# Patient Record
(demographics unavailable — no encounter records)

---

## 2017-12-02 NOTE — CP.PCM.HP
History of Present Illness





- History of Present Illness


History of Present Illness: 


CC-left elbow pain





4 year old female slipped from monkey bars and fell onto her outstretched left 

arm. She  had pain and decreased movement and parents brought her to ED.X rays 

revealed fracture of left distal humerus.Ortho will do closed reduction on 

Monday after decrease in swelling.She is admitted for pain management.





PMH-none


Immunization:uptodate


PSH-none


Medications-none


NKA


SH-she is in Pre-K.Lives with parents and siblings





Present on Admission





- Present on Admission


Any Indicators Present on Admission: No





Review of Systems





- Constitutional


Constitutional: absent: Fever





- EENT


Eyes: absent: Change in Vision


Ears: absent: Ear Pain


Nose/Mouth/Throat: absent: Nasal Congestion





- Cardiovascular


Cardiovascular: absent: Chest Pain





- Respiratory


Respiratory: absent: Cough, Dyspnea





- Gastrointestinal


Gastrointestinal: absent: Abdominal Pain, Diarrhea, Vomiting





- Genitourinary


Genitourinary: absent: Dysuria





- Musculoskeletal


Additional comments: 





left elbow pain





- Integumentary


Integumentary: absent: Rash





- Neurological


Neurological: absent: Abnormal Movements





- Psychiatric


Psychiatric: absent: Abnormal Sleep Pattern





- Endocrine


Endocrine: absent: Fatigue





Past Patient History





- CARDIAC


Hx Cardiac Disorders: No





- PULMONARY


Hx Respiratory Disorders: No





- NEUROLOGICAL


Hx Neurological Disorder: No





- HEENT


Hx HEENT Problems: No





- RENAL


Hx Chronic Kidney Disease: No





- ENDOCRINE/METABOLIC


Hx Endocrine Disorders: No





- HEMATOLOGICAL/ONCOLOGICAL


Hx Blood Disorders: No


Hx Blood Transfusions: No





- INTEGUMENTARY


Hx Dermatological Problems: No





- MUSCULOSKELETAL/RHEUMATOLOGICAL


Hx Musculoskeletal Disorders: No





- GASTROINTESTINAL


Hx Gastrointestinal Disorders: No





- GENITOURINARY/GYNECOLOGICAL


Hx Genitourinary Disorders: No





- PSYCHIATRIC


Hx Psychophysiologic Disorder: No





- SURGICAL HISTORY


Hx Surgeries: No





- ANESTHESIA


Hx Anesthesia: No





Meds


Allergies/Adverse Reactions: 


 Allergies











Allergy/AdvReac Type Severity Reaction Status Date / Time


 


No Known Allergies Allergy   Verified 12/02/17 15:17














Physical Exam





- Constitutional


Appears: Well, No Acute Distress





- Head Exam


Head Exam: NORMAL INSPECTION, NORMOCEPHALIC





- Eye Exam


Eye Exam: EOMI, Normal appearance, PERRL





- ENT Exam


ENT Exam: Mucous Membranes Moist, Normal Exam, Normal Oropharynx, TM's Normal 

Bilaterally





- Neck Exam


Neck exam: Positive for: Full Rom, Normal Inspection.  Negative for: 

Lymphadenopathy





- Respiratory Exam


Respiratory Exam: Clear to Auscultation Bilateral, NORMAL BREATHING PATTERN





- Cardiovascular Exam


Cardiovascular Exam: REGULAR RHYTHM, +S1, +S2


Additional comments: 





No murmur





- GI/Abdominal Exam


GI & Abdominal Exam: Normal Bowel Sounds, Soft.  absent: Mass





- Extremities Exam


Extremities exam: Positive for: normal capillary refill


Additional comments: 





Left arm in soft cast.Patient able to wiggle fingers.No swelling of 

fingers.Warm to touch





- Neurological Exam


Neurological exam: Alert, Oriented x3





- Skin


Skin Exam: Normal Color, Warm





Results





- Vital Signs


Recent Vital Signs: 





 Last Vital Signs











Temp  98.5 F   12/02/17 18:52


 


Pulse  108   12/02/17 18:52


 


Resp  18 L  12/02/17 18:52


 


BP  110/75   12/02/17 18:52


 


Pulse Ox  99   12/02/17 18:52














- Labs


Result Diagrams: 


 12/02/17 17:45





 12/02/17 17:45


Labs: 





 Laboratory Results - last 24 hr











  12/02/17 12/02/17





  17:45 17:45


 


WBC  15.7 H 


 


RBC  5.55 H 


 


Hgb  10.2 L 


 


Hct  32.9 


 


MCV  59.3 L 


 


MCH  18.5 L 


 


MCHC  31.2 L 


 


RDW  15.9 H 


 


Plt Count  298 


 


MPV  8.8 


 


Neut % (Auto)  76.8 H 


 


Lymph % (Auto)  15.8 L 


 


Mono % (Auto)  6.2 


 


Eos % (Auto)  0.9 


 


Baso % (Auto)  0.3 


 


Neut #  12.0 H 


 


Lymph #  2.5 


 


Mono #  1.0 H 


 


Eos #  0.1 


 


Baso #  0.1 


 


Sodium   141


 


Potassium   3.8


 


Chloride   108 H


 


Carbon Dioxide   26


 


Anion Gap   11


 


BUN   17


 


Creatinine   0.4


 


Est GFR ( Amer)   TNP


 


Est GFR (Non-Af Amer)   TNP


 


Random Glucose   151 H


 


Calcium   9.3


 


Total Bilirubin   0.2


 


AST   43


 


ALT   39


 


Alkaline Phosphatase   243


 


Total Protein   7.3


 


Albumin   4.0


 


Globulin   3.3


 


Albumin/Globulin Ratio   1.2














Assessment & Plan





- Assessment and Plan (Free Text)


Assessment: 





4 year old female with left distal humerus fracture


Plan: 





Admit to pediatric floor


Orthopedic consult called from ER


Ibuprofen PRN pain


Regular diet


Saline lock





- Date & Time


Date: 12/02/17


Time: 19:03

## 2017-12-02 NOTE — ED PDOC
HPI: Pediatric Injury





- HPI


Time Seen by Provider: 17 15:18


Chief Complaint (Nursing): Upper Extremity Problem/Injury


Chief Complaint (Provider): Left Elbow Pain


History Per: Patient, Family (parents)


History/Exam Limitations: no limitations


Onset/Duration Of Symptoms: Mins


Injury Occurred (Timing): Today @ (1300)


Injury Occurred At: Park/Playground


Additional Complaint(s): 


Rona Lyn, a 4 year old female, is brought into the ED by her parents 

complaining of left elbow pain. As per mother, at around 1300 the patient was 

at the park playing on the monkey bars. She states that the patient attempted 

to grab one of the bars but missed causing her to fall onto her outstretched 

left arm. Parent reports that the patient has been having pin and decreased 

movement since then. The mother states that the patient was given ibuprofen 

around 1400 and the father also made a make shift sling and then brought her in 

to the ED to be evaluated. Denies head injury or any other injury. Vaccinations 

up to date.





Pediatrician: Dr. Jewell





- Birth History


Length of Pregnancy: Full Term





Past Medical History-Pediatric


Reviewed: Historical Data, Nursing Documentation, Vital Signs





- Medical History


PMH: No Chronic Diseases





- Surgical History


Surgical History: No Surg Hx





- Family History


Family History: States: Unknown Family Hx





- Social History


Lives With A Smoker: No





- Immunization History


Hx Tetanus Toxoid Vaccination: Yes


Hx Influenza Vaccination: Yes


Hx Pneumococcal Vaccination: Yes





- Home Medications


Home Medications: 


 Ambulatory Orders











 Medication  Instructions  Recorded


 


Acetaminophen with Codeine 5 ml PO Q4 PRN #120 ml 17





[Acetaminop-Codeine 120-12 mg/5]  


 


Cephalexin Susp [Keflex] 5 ml PO BID #10 ml 17


 


Ibuprofen Susp [Motrin Oral Susp] 190 mg PO Q6 PRN  udc 17














- Allergies


Allergies/Adverse Reactions: 


 Allergies











Allergy/AdvReac Type Severity Reaction Status Date / Time


 


No Known Allergies Allergy   Verified 17 15:17














Review of Systems


ROS Statement: Except As Marked, All Systems Reviewed And Found Negative


Constitutional: Negative for: Fever, Chills


Musculoskeletal: Positive for: Arm Pain (left arm pain)


Skin: Negative for: Lesions


Neurological: Negative for: Weakness, Numbness





Physical Exam - Pediatric





- Physical Exam


Appears: In Acute Distress (mild painful)


Head Exam: ATRAUMATIC, NORMOCEPHALIC


Skin: Warm, Dry


Eye Exam: bilateral eye: PERRL, EOMI


Neck: Painless ROM, Supple


Extremity: Other (LEFT upper extremity: held in adduction with flexion at the 

elbow, edema slight deformity at area of distal humerus with ttp at this site, 

unable to range at elbow due to pain; wrist flex/ext, thumb abduction and 

opposition and finger abduction 5/5 strenth; light touch intact in all nerve 

distrbutions of LEFT hand; <2 sec cap refill; 2+ radial pulse)


Neurological/Psych: Normal Motor, Normal Sensation


Gait: Steady





- Laboratory Results


Result Diagrams: 


 17 17:45





 17 17:45





- ECG


O2 Sat by Pulse Oximetry: 99 (RA)


Pulse Ox Interpretation: Normal





- Other Rad


  ** LEFT elbow


X-Ray: Interpreted by Me (Lateral condylar fracture with slight displacement)





Medical Decision Making


Medical Decision Makin


Initial Impression


5 y/o female presenting with left elbow injury





Differentials: Fracture, Strain, Dislocation





Initial Plan:


* Rad LFT Elbow 3 views 


* Motrin 190mg PO 


* Tylenol 240mg PO 


* Apply Ice to affected area 


* Reevaluation


 430p


Xray demonstrated elbow fracture. Discussed with Dr Lori sanders, who 

reviewed xray then discussed management with mother on the phone. Pt to be 

admitted for elbow edema and surgical management of fracture. Placed in 

immobilizer by EDT and neurovascularly intact after splint.





_________________________________________________________________


Scribe Attestation


Documented by Jie Floyd acting as a scribe for Shanita Davenport MD.





Provider Attestation


All medical record entries made by the Scribe were at my direction and 

personally dictated by me. I have reviewed the chart and agree that the record 

accurately reflects my personal performance of the history, physical exam, 

medical decision making, and the department course for this patient. I have 

also personally directed, reviewed, and agree with the discharge instructions 

and disposition.





PECARN





- Discussion


Discussion: 








Disposition





- Clinical Impression


Clinical Impression: 


 Traumatic closed displaced fracture of lateral condyle of left elbow





Counseled Patient/Family Regarding: Studies Performed, Diagnosis





- Disposition


Disposition Time: 16:30


Condition: STABLE





- Pt Status Changed To:


Hospital Disposition Of: Inpatient





- Admit Certification


Admit to Inpatient:: After my assessment, the patient will require 

hospitalization for at least two midnights.  This is because of the severity of 

symptoms shown, intensity of services needed, and/or the medical risk in this 

patient being treated as an outpatient.





- POA


Present On Arrival: Falls Or Trauma

## 2017-12-02 NOTE — CT
EXAM:

  CT Left Upper Extremity Without Intravenous  Contrast, Elbow



CLINICAL HISTORY:

  4 years old, female; Injury or trauma; Fall; Initial encounter; Fracture, 

traumatic injury; Closed fracture and displaced; Elbow; Left; Additional info: 

Left elbow fracture



TECHNIQUE:

  Axial computed tomography images of the left elbow without intravenous 

contrast.  All CT scans at this facility use one or more dose reduction 

techniques, viz.: automated exposure control; ma/kV adjustment per patient size 

(including targeted exams where dose is matched to indication; i.e. head); or 

iterative reconstruction technique.

  Coronal and sagittal reformatted images were created and reviewed.



COMPARISON:

  No relevant prior studies available.



FINDINGS:

  Limitations:  Suboptimal positioning.

  Bones/joints:  Comminuted, minimally displaced fracture distal humeral 

metaphysis.  Minimal anterior angulation at fracture apex.  No dislocation.  

Joint effusion.

  Soft tissues:  Soft tissue stranding/swelling.



IMPRESSION:     

1.  Distal humeral fracture.

## 2017-12-03 NOTE — RAD
PROCEDURE:  Left elbow dated 12/02/2017



HISTORY:

Left elbow injury. 



COMPARISON:

Correlation made with concurrent radiograph of the right elbow.



TECHNIQUE:

Three views of the right three views of the left elbow performed.  In 

addition, three views of the right elbow obtained for comparison 

purposes. 



FINDINGS:

The current study reveals an apparent comminuted fracture of the 

distal left humerus (intracondylar



IMPRESSION:

Apparent intracondylar comminuted fracture of the distal left 

humerus. Follow-up CT scan recommended.

## 2017-12-03 NOTE — CP.PCM.PN
Subjective





- Date & Time of Evaluation


Date of Evaluation: 12/03/17


Time of Evaluation: 12:00





- Subjective


Subjective: 





4-year-old girl admitted to PEDS yesterday B/O left supracondylar humerus FX 

that happened after a fall yesterday afternoon.





On exam today:


Left arm in splint.


No pain.


Normal temp and movement of left fingers.


No other complaints.





Child is usually healthy.  No previous surgeries.


Seen by ortho and scheduled for reduction tomorrow morning (plan to do ORIF).





Objective





- Vital Signs/Intake and Output


Vital Signs (last 24 hours): 


 











Temp Pulse Resp BP Pulse Ox


 


 98.5 F   88   20   116/82 H  100 


 


 12/03/17 13:00  12/03/17 13:00  12/03/17 13:00  12/03/17 08:12  12/03/17 13:00











- Medications


Medications: 


 Current Medications





Ibuprofen (Motrin Oral Susp)  190 mg PO Q6 PRN


   PRN Reason: mild-moderate pain


   Last Admin: 12/02/17 23:34 Dose:  190 mg











- Labs


Labs: 


 





 12/02/17 17:45 





 12/02/17 17:45 











- Constitutional


Appears: Well





- Head Exam


Head Exam: ATRAUMATIC, NORMAL INSPECTION





- Eye Exam


Eye Exam: EOMI, Normal appearance, PERRL.  absent: Conjunctival injection, 

Periorbital swelling


Pupil Exam: absent: Miosis, Mydriatic





- ENT Exam


ENT Exam: Normal Exam





- Neck Exam


Neck Exam: Full ROM.  absent: Lymphadenopathy





- Respiratory Exam


Respiratory Exam: Clear to Ausculation Bilateral, NORMAL BREATHING PATTERN.  

absent: Decreased Breath Sounds, Prolonged Expiratory Phase, Rales, Rhonchi, 

Wheezes





- Cardiovascular Exam


Cardiovascular Exam: REGULAR RHYTHM.  absent: Bradycardia, Tachycardia, Murmur





- GI/Abdominal Exam


GI & Abdominal Exam: Soft.  absent: Distended, Tenderness





- Extremities Exam


Additional comments: 





Left arm in cast.  Mild swelling of the left hand.  Normal temp, sensation, 

color and movement of the left fingers.





- Neurological Exam


Neurological Exam: Alert, Awake, CN II-XII Intact





- Skin


Skin Exam: Normal Color, Warm


Additional comments: 





No acute rash.





Assessment and Plan





- Assessment and Plan (Free Text)


Assessment: 


4-year-old girl with left supracondylar humerus FX.


Plan: 


Plan for reduction tomorrow.


NPO after midnight.


IVF overnight.

## 2017-12-03 NOTE — CP.PCM.CON
History of Present Illness





- History of Present Illness


History of Present Illness: 





ID; 3 yo female





CC: pain and deformity L elbow  Pt presents at this encounter with mother and 

ped nurse Lynne





HPI:  3 yo female presents after fall from monkey bars in park yesterday 

afternoon.  Presents to [facility] with displaced/angulated supracondylar humerus fx





 Xrays reviewed CT confirmatory.  pt admitted with swollen L elbow;when 

swelling decreases, pt to OR (Monday AM)





Review of Systems





- Hematologic/Lymphatic


Additional comments: 





ROS-not contrib  no neuro deficit





Past Patient History





- CARDIAC


Hx Cardiac Disorders: No





- PULMONARY


Hx Respiratory Disorders: No





- NEUROLOGICAL


Hx Neurological Disorder: No





- HEENT


Hx HEENT Problems: No





- RENAL


Hx Chronic Kidney Disease: No





- ENDOCRINE/METABOLIC


Hx Endocrine Disorders: No





- HEMATOLOGICAL/ONCOLOGICAL


Hx Blood Disorders: No


Hx Blood Transfusions: No





- INTEGUMENTARY


Hx Dermatological Problems: No





- MUSCULOSKELETAL/RHEUMATOLOGICAL


Hx Musculoskeletal Disorders: No





- GASTROINTESTINAL


Hx Gastrointestinal Disorders: No





- GENITOURINARY/GYNECOLOGICAL


Hx Genitourinary Disorders: No





- PSYCHIATRIC


Hx Psychophysiologic Disorder: No





- SURGICAL HISTORY


Hx Surgeries: No





- ANESTHESIA


Hx Anesthesia: No





Meds


Allergies/Adverse Reactions: 


 Allergies











Allergy/AdvReac Type Severity Reaction Status Date / Time


 


No Known Allergies Allergy   Verified 12/02/17 15:17














- Medications


Medications: 


 Current Medications





Ibuprofen (Motrin Oral Susp)  190 mg PO Q6 PRN


   PRN Reason: mild-moderate pain


   Last Admin: 12/02/17 23:34 Dose:  190 mg











Physical Exam





- Skin


Additional comments: 


Physical Exam





systemic wnl





Musculoskeletal


stance.gait- defrred


L upper ext splint intact


elbow swollen


N/V intact





Results





- Vital Signs


Recent Vital Signs: 


 Last Vital Signs











Temp  98.6 F   12/03/17 08:12


 


Pulse  87   12/03/17 08:12


 


Resp  18 L  12/03/17 08:12


 


BP  116/82 H  12/03/17 08:12


 


Pulse Ox  99   12/03/17 08:12














- Labs


Result Diagrams: 


 12/02/17 17:45





 12/02/17 17:45


Labs: 


 Laboratory Results - last 24 hr











  12/02/17 12/02/17 12/02/17





  17:45 17:45 17:45


 


WBC  15.7 H  


 


RBC  5.55 H  


 


Hgb  10.2 L  


 


Hct  32.9  


 


MCV  59.3 L  


 


MCH  18.5 L  


 


MCHC  31.2 L  


 


RDW  15.9 H  


 


Plt Count  298  


 


MPV  8.8  


 


Neut % (Auto)  76.8 H  


 


Lymph % (Auto)  15.8 L  


 


Mono % (Auto)  6.2  


 


Eos % (Auto)  0.9  


 


Baso % (Auto)  0.3  


 


Neut #  12.0 H  


 


Lymph #  2.5  


 


Mono #  1.0 H  


 


Eos #  0.1  


 


Baso #  0.1  


 


Sodium   141 


 


Potassium   3.8 


 


Chloride   108 H 


 


Carbon Dioxide   26 


 


Anion Gap   11 


 


BUN   17 


 


Creatinine   0.4 


 


Est GFR ( Amer)   TNP 


 


Est GFR (Non-Af Amer)   TNP 


 


Random Glucose   151 H 


 


Calcium   9.3 


 


Total Bilirubin   0.2 


 


AST   43 


 


ALT   39 


 


Alkaline Phosphatase   243 


 


Total Protein   7.3 


 


Albumin   4.0 


 


Globulin   3.3 


 


Albumin/Globulin Ratio   1.2 


 


Blood Type    O POSITIVE


 


Blood Type Confirm   


 


Antibody Screen    Negative


 


BBK History Checked    No verified bt














  12/02/17





  19:37


 


WBC 


 


RBC 


 


Hgb 


 


Hct 


 


MCV 


 


MCH 


 


MCHC 


 


RDW 


 


Plt Count 


 


MPV 


 


Neut % (Auto) 


 


Lymph % (Auto) 


 


Mono % (Auto) 


 


Eos % (Auto) 


 


Baso % (Auto) 


 


Neut # 


 


Lymph # 


 


Mono # 


 


Eos # 


 


Baso # 


 


Sodium 


 


Potassium 


 


Chloride 


 


Carbon Dioxide 


 


Anion Gap 


 


BUN 


 


Creatinine 


 


Est GFR ( Amer) 


 


Est GFR (Non-Af Amer) 


 


Random Glucose 


 


Calcium 


 


Total Bilirubin 


 


AST 


 


ALT 


 


Alkaline Phosphatase 


 


Total Protein 


 


Albumin 


 


Globulin 


 


Albumin/Globulin Ratio 


 


Blood Type 


 


Blood Type Confirm  O POSITIVE


 


Antibody Screen 


 


BBK History Checked 














- Impressions


Impression: 





Xtray- disoplaced/angulated L supracondylary humerus fracture








CT- displaced/angulated supracondylar humerus fx





Assessment & Plan





- Assessment and Plan (Free Text)


Assessment: 





A- displaced/angulated supracondyl;ar humerus fx








P- to OR for ORIF displaced/angulated supracondylar humerus fx  Pros/cons risk 

and benfitis discussed at length- possibility of mechanical failure/infection/

thromboembolic disease/possibility of secondary nasir for pin removal discussed   

Possibilit of growth disturbance/limited ROM 2nd or tertiary surgery discussed 

  Option of second opinion or transfer discussed   possibility of growth 

disturbance/nerve injury/mechanical failure/infection/thromboembolic disease/ 

secondary or tertiary surg discussed  NO PROMISES GUARANTEES!!!

## 2017-12-04 NOTE — PCM.SURG1
Surgeon's Initial Post Op Note





- Surgeon's Notes


Surgeon: Lori


Assistant: CIERA Mack


Type of Anesthesia: General Endo


Anesthesia Administered By: Ulysses Ji


Pre-Operative Diagnosis: Displaced/angulated/comminuted supracondylar humerus 

fx L elbow


Operative Findings: displaced/comminuted supracondylat L humerus fx


Post-Operative Diagnosis: as above


Operation Performed: ORIF DISPLACED/ANGULATED/COMMINUTED SUPRACONDYLAR HUMERUS 

FX.  ELBOW ARTHROTOMY.  APPLX LONG ARM CAST.  POSITIONING OF FLUOR/

INTERPREEATION OF VIDEO IMAGES`


Specimen/Specimens Removed: fx callous


Estimated Blood Loss: EBL {In ML}: 10


Blood Products Given: N/A


Drains Used: No Drains


Post-Op Condition: Good


Date of Surgery/Procedure: 12/04/17


Time of Surgery/Procedure: 12:50 (time in room/anaesthwesia induction time 1150)

## 2017-12-04 NOTE — CP.PCM.DIS
Provider





- Provider


Date of Admission: 


12/02/17 16:35





Attending physician: 


Oneyda Cerraot MD





Primary care physician: 





Fanta Medeiros


Consults: 


ortho. ( Dr. Darby)


Time Spent in preparation of Discharge (in minutes): 28





Diagnosis





- Discharge Diagnosis


(1) Traumatic closed displaced fracture of lateral condyle of left elbow


Status: Acute   Priority: High   





Hospital Course





- Lab Results


Lab Results: 


 Most Recent Lab Values











WBC  15.7 K/uL (4.5-15.5)  H  12/02/17  17:45    


 


RBC  5.55 Mil/uL (3.70-5.10)  H  12/02/17  17:45    


 


Hgb  10.2 g/dL (11.0-16.0)  L  12/02/17  17:45    


 


Hct  32.9 % (32.0-45.0)   12/02/17  17:45    


 


MCV  59.3 fl (70.0-95.0)  L  12/02/17  17:45    


 


MCH  18.5 pg (25.0-32.0)  L  12/02/17  17:45    


 


MCHC  31.2 g/dL (32.0-38.0)  L  12/02/17  17:45    


 


RDW  15.9 % (11.5-14.5)  H  12/02/17  17:45    


 


Plt Count  298 K/uL (130-400)   12/02/17  17:45    


 


MPV  8.8 fl (7.2-11.7)   12/02/17  17:45    


 


Neut % (Auto)  76.8 % (25.0-65.0)  H  12/02/17  17:45    


 


Lymph % (Auto)  15.8 % (40.0-70.0)  L  12/02/17  17:45    


 


Mono % (Auto)  6.2 % (0.0-10.0)   12/02/17  17:45    


 


Eos % (Auto)  0.9 % (0.0-4.0)   12/02/17  17:45    


 


Baso % (Auto)  0.3 % (0.0-2.0)   12/02/17  17:45    


 


Neut #  12.0 K/uL (1.5-8.5)  H  12/02/17  17:45    


 


Lymph #  2.5 K/uL (1.6-7.4)   12/02/17  17:45    


 


Mono #  1.0 K/uL (0.0-0.8)  H  12/02/17  17:45    


 


Eos #  0.1 K/uL (0.0-0.7)   12/02/17  17:45    


 


Baso #  0.1 K/uL (0.0-0.2)   12/02/17  17:45    


 


Sodium  141 mmol/l (132-148)   12/02/17  17:45    


 


Potassium  3.8 MMOL/L (3.6-5.0)   12/02/17  17:45    


 


Chloride  108 mmol/L ()  H  12/02/17  17:45    


 


Carbon Dioxide  26 mmol/L (22-30)   12/02/17  17:45    


 


Anion Gap  11  (10-20)   12/02/17  17:45    


 


BUN  17 mg/dl (7-17)   12/02/17  17:45    


 


Creatinine  0.4 mg/dl (0.2-0.5)   12/02/17  17:45    


 


Est GFR ( Amer)  TNP   12/02/17  17:45    


 


Est GFR (Non-Af Amer)  TNP   12/02/17  17:45    


 


Random Glucose  151 mg/dL ()  H  12/02/17  17:45    


 


Calcium  9.3 mg/dL (8.4-10.2)   12/02/17  17:45    


 


Total Bilirubin  0.2 mg/dl (0.2-1.3)   12/02/17  17:45    


 


AST  43 U/L (8-50)   12/02/17  17:45    


 


ALT  39 U/L (9-52)   12/02/17  17:45    


 


Alkaline Phosphatase  243 U/L (169-372)   12/02/17  17:45    


 


Total Protein  7.3 G/DL (6.3-8.2)   12/02/17  17:45    


 


Albumin  4.0 g/dL (3.5-5.0)   12/02/17  17:45    


 


Globulin  3.3 gm/dL (2.2-3.9)   12/02/17  17:45    


 


Albumin/Globulin Ratio  1.2  (1.0-2.1)   12/02/17  17:45    


 


Blood Type  O POSITIVE   12/02/17  17:45    


 


Blood Type Confirm  O POSITIVE   12/02/17  19:37    


 


Antibody Screen  Negative   12/02/17  17:45    


 


BBK History Checked  No verified bt   12/02/17  17:45    














- Hospital Course


Hospital Course: 





The patient was admitted for c/o trauma to left arm, s/p fall off monkey bar.


She had fracture of left distal humerus that was initially splinted.


She underwent ORIF today and had 2 doses of Ancef.


She' s not in pain and wants to go home.


She tolerated PO challenge.


DX: S/P ORIF of left elbow fracture.








Discharge Exam





- Head Exam


Head Exam: ATRAUMATIC, NORMOCEPHALIC





- Eye Exam


Eye Exam: Normal appearance





- Neck Exam


Neck exam: Normal Inspection





- Extremities Exam


Extremities exam: full ROM, normal capillary refill, normal inspection (except 

left arm is in a cast, normal color and no swelling of fingers.)





Discharge Plan





- Discharge Medications


Prescriptions: 


Acetaminophen with Codeine [Acetaminop-Codeine 120-12 mg/5] 5 ml PO Q4 PRN #120 

ml


 PRN Reason: Pain, Moderate (4-7)


Cephalexin Susp [Keflex] 5 ml PO BID #10 ml





- Follow Up Plan


Condition: STABLE


Disposition: HOME/ ROUTINE


Patient education suggested?: Yes


Instructions:  Elbow Fracture in Children (GEN), Patient Safety in the [facility] 

for Children (GEN), How To Wash Your Hands (GEN)

## 2017-12-04 NOTE — RAD
PROCEDURE:  Left humerus 



HISTORY:

post op



COMPARISON:

12/02/2017.  Preoperative examination documenting comminuted fracture 

of distal left humerus.



TECHNIQUE:

Standard protocol for this study/examination. Portable study 16:25 



FINDINGS:

Satisfactory postoperative status.  Orthopedic hardware in 

satisfactory position and alignment traversing fracture fragments of 

the distal humerus.



IMPRESSION:

Satisfactory postoperative status. Limitations of the current study: 

Detail obscured by overlying fiberglass cast.

## 2017-12-05 NOTE — OP
PROCEDURE DATE:  12/04/2017



LOCATION:  [facility].



PREOPERATIVE DIAGNOSIS:  Displaced angulated comminuted supracondylar

fracture of the left humerus.



POSTOPERATIVE DIAGNOSIS:  Displaced angulated comminuted supracondylar

fracture of the left humerus.



PROCEDURES PERFORMED:

1.  Open reduction and internal fixation with two incisions of displaced

comminuted angulated supracondylar left humerus fracture and left elbow.

2.  Arthrotomy, left elbow.

3.  Application of long-arm cast.

4.  Positioning of fluoroscope, interpretation of video images.



SURGEON:  Kishore Sandoval MD



FIRST ASSISTANT:  Muriel Jose, certified registered nursing first

assistant.



TYPE OF ANESTHESIA:  General endotracheal anesthesia.



ANESTHESIA ADMINISTERED BY:  Raphael Ji MD



COMPLICATIONS:  None.



DRAINS:  None.



OPERATIVE INDICATION:  Rona Lyn is a 4-year-old young lady who

sustained an injury on the monkey bars late Saturday afternoon.  The

patient was admitted to [facility] with marked

tenderness and swelling of the left elbow.  Pros, cons, risks, and benefits

of surgical approach were discussed.  The patient could not be taken to

surgery on Sunday because of the swelling.  Neurocirculatory status is

intact.  There is no evidence of compartment syndrome.  X-rays and CT scan

were reviewed.  Pros, cons, risks, and benefits of surgical approach were

discussed at length with the patient's mother, Marlen Valadez.  The

possibility of mechanical failure of the hardware, displacement of the

hardware, infection, thromboembolic disease, residual deformity, secondary

or tertiary surgery was discussed.  The concept that I am not a dedicated

pediatric orthopedist was discussed.  The concept that the patient will

definitely require a secondary procedure for hardware removal was

discussed.  The concept that perhaps later corrective osteotomy may be

necessary was discussed at length as well.



OPERATIVE PROCEDURE:  After having obtained informed consent, after having

identified side, site, and procedure and a critical pause/time-out, after

the satisfactory induction of the anesthetic, the patient identified as

Rona Lyn in the supine position with all bony prominences well padded. 

The left upper extremity was prepped and draped in the usual fashion for

upper extremity surgery.  Closed reduction was attempted, but failed. 

Verification of position was offered on AP and lateral image

intensification views.  Two attempts were made.  At this point in time, it

was found that a successful open reduction would be necessary.  At this

point, an incision is made centered on the lateral epicondyle and extending

superficially over the extensor musculature _____ and proximally bisecting

the humerus.  Great care was taken to be cognizant of the radial nerve and

the superficial sensory branch of the radial nerve.  Skin incision was

carried down through the skin and subcutaneous tissue.  The fascia was

divided.  At this point in time, great care was taken to avoid injury to

the nerve by keeping the forearm pronated.  This having been accomplished,

the dissection was carried down to the lateral aspect of the humerus again

using #15 blade.  The lateral fragment is comminuted, found to be angulated

and displaced with rotation of the distal fragment.  Two attempts were made

for closed reduction with exacerbation of the deformity, reversal of

deformity and locking the fragment in the pronation of the forearm with the

fixation from the radial side.  It was unable to control rotation from just

the radial side.  At this point in time, decision was made under the

surgeon's direction to fluoroscope this position.  Video images were

generated, therapeutic decisions were made therefrom.  Decision was made at

that point in time to open the medial aspect, taking great care to be

cognizant of the ulnar nerve.  Incision was described two fingerbreadths

proximal to the medial epicondyle and two fingerbreadths distally.  The

skin incision was carried down through the skin and subcutaneous tissue. 

At this point in time, hemostasis was controlled.  The dissection was

carried out medially to the proximal aspect of the humerus and to the area

of the medial supracondylar fragment.  At this point in time, all pins had

been removed on the lateral side and the fracture was again reduced with

flexion and held in that way.  Great care was taken to carefully dissect

and protect the ulnar nerve posteriorly, and we flexed and marked an

acceptable position of the supracondylar fragment and one pin was

introduced medially, taking great care to avoid injury to the ulnar nerve. 

This having been accomplished, one pin was placed across the fragment

ulnarly from the medial aspect of the epicondyle into the humerus. 

Attention was now turned to the radial side and reduction was found to be

more easily accomplished and three pins were placed across the fracture. 

It should be noted that arthrotomy had been accomplished to evaluate the

reduction.  Reduction was found to be acceptable with the joint

reconstituted.  The wound was thoroughly irrigated at this point in time. 

Closure was in layers, taking great care to avoid injury again to the

radial nerve, most particularly the superficial sensory branch.  Closure

was in layers with Vicryl and staples.  Closure medially was also with

Vicryl and staples.  Careful compression dressing and long-arm cast were

applied.  Verification of position was offered again on AP and lateral

image intensification views and found to be although not perfect,

acceptable with evidence of free range of motion fully and with excellent

overall alignment with no varus or valgus deformity.  Neurocirculatory

status was intact in recovery.







__________________________________________

Kishore Sandoval MD





DD:  12/05/2017 8:44:38

DT:  12/05/2017 8:52:54

Job # [id number]

## 2017-12-05 NOTE — RAD
PROCEDURE:  Fluoroscopy over 1 hour



HISTORY:

LEFT ELBOW ORIF



COMPARISON:

None



TECHNIQUE:

Standard protocol for this study/examination.



FINDINGS:

 Total fluoroscopic time (continuous mode) utilized during the 

procedure: 41.6 seconds. Total exam DLP: (mGy): 1.22



IMPRESSION:

Submitted images from the current procedure: 18.0